# Patient Record
Sex: FEMALE | Race: OTHER | NOT HISPANIC OR LATINO
[De-identification: names, ages, dates, MRNs, and addresses within clinical notes are randomized per-mention and may not be internally consistent; named-entity substitution may affect disease eponyms.]

---

## 2019-02-04 ENCOUNTER — APPOINTMENT (OUTPATIENT)
Dept: INTERNAL MEDICINE | Facility: CLINIC | Age: 29
End: 2019-02-04

## 2019-02-26 ENCOUNTER — EMERGENCY (EMERGENCY)
Facility: HOSPITAL | Age: 29
LOS: 1 days | Discharge: HOME | End: 2019-02-26
Attending: EMERGENCY MEDICINE | Admitting: EMERGENCY MEDICINE
Payer: OTHER MISCELLANEOUS

## 2019-02-26 VITALS
TEMPERATURE: 98 F | SYSTOLIC BLOOD PRESSURE: 133 MMHG | OXYGEN SATURATION: 98 % | HEART RATE: 97 BPM | WEIGHT: 145.06 LBS | DIASTOLIC BLOOD PRESSURE: 80 MMHG | RESPIRATION RATE: 16 BRPM

## 2019-02-26 PROCEDURE — 99283 EMERGENCY DEPT VISIT LOW MDM: CPT
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
## 2019-02-26 NOTE — ED PROVIDER NOTE - NEUROLOGICAL, MLM
Subjective numbness to mid forearm. Weakness in median and ulnar nerves. Sensation intact but describes "pins and needle" sensation in hands with palpation.
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
## 2019-02-26 NOTE — ED PROVIDER NOTE - CARE PROVIDERS DIRECT ADDRESSES
[email or web address]
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
## 2019-02-26 NOTE — ED PROVIDER NOTE - CARE PROVIDER_API CALL
Newton Lemon)  Neurology; Neuromuscular Medicine  [address], [address] [place] [id number]  Phone: [phone]  Fax: [phone]  Follow Up Time:     Corry Uriostegui)  Neurology  [address], [address] [place], [place] [address]  Phone: [phone]  Fax: [phone]  Follow Up Time:

## 2019-02-26 NOTE — ED PROVIDER NOTE - NSFOLLOWUPINSTRUCTIONS_ED_ALL_ED_FT
Please continue to exercise and move the Left arm and hand.  Please return if symptoms worsen or you develop sever muscle pain or dark urine please come to the ER for reassessment.  Please follow up with one of our Neuromuscular specialists (see names in the documents).

## 2019-02-26 NOTE — ED PROVIDER NOTE - CLINICAL SUMMARY MEDICAL DECISION MAKING FREE TEXT BOX
Patient presenting with arm tingling and hand weakness in median and ulnar nerve distribution. Will gave f/u with [facility]. Counselled that she may need PT.

## 2019-02-26 NOTE — ED ADULT NURSE NOTE - CHPI ED NUR SYMPTOMS NEG
no vomiting/no confusion/no change in level of consciousness/no nausea/no numbness/no weakness/no blurred vision/no loss of consciousness/no dizziness/no fever

## 2019-02-26 NOTE — ED ADULT NURSE NOTE - NSIMPLEMENTINTERV_GEN_ALL_ED
Implemented All Universal Safety Interventions:  [place] to call system. Call bell, personal items and telephone within reach. Instruct patient to call for assistance. Room bathroom lighting operational. Non-slip footwear when patient is off stretcher. Physically safe environment: no spills, clutter or unnecessary equipment. Stretcher in lowest position, wheels locked, appropriate side rails in place.

## 2019-02-27 ENCOUNTER — INBOUND DOCUMENT (OUTPATIENT)
Age: 29
End: 2019-02-27
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
## 2019-03-02 DIAGNOSIS — T75.4XXA ELECTROCUTION, INITIAL ENCOUNTER: ICD-10-CM

## 2019-03-02 DIAGNOSIS — S44.92XA INJURY OF UNSPECIFIED NERVE AT SHOULDER AND UPPER ARM LEVEL, LEFT ARM, INITIAL ENCOUNTER: ICD-10-CM
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
